# Patient Record
Sex: MALE | Race: WHITE | NOT HISPANIC OR LATINO | Employment: FULL TIME | URBAN - NONMETROPOLITAN AREA
[De-identification: names, ages, dates, MRNs, and addresses within clinical notes are randomized per-mention and may not be internally consistent; named-entity substitution may affect disease eponyms.]

---

## 2018-08-16 ENCOUNTER — OFFICE VISIT (OUTPATIENT)
Dept: ORTHOPEDIC SURGERY | Facility: CLINIC | Age: 33
End: 2018-08-16

## 2018-08-16 VITALS — HEIGHT: 69 IN | WEIGHT: 300 LBS | BODY MASS INDEX: 44.43 KG/M2

## 2018-08-16 DIAGNOSIS — M79.604 RIGHT LEG PAIN: Primary | ICD-10-CM

## 2018-08-16 PROCEDURE — 99213 OFFICE O/P EST LOW 20 MIN: CPT | Performed by: PHYSICIAN ASSISTANT

## 2018-08-16 RX ORDER — BUPROPION HYDROCHLORIDE 100 MG/1
150 TABLET ORAL 2 TIMES DAILY
COMMUNITY

## 2018-08-16 RX ORDER — CLONAZEPAM 0.5 MG/1
0.5 TABLET ORAL 2 TIMES DAILY PRN
COMMUNITY

## 2018-08-16 RX ORDER — CYCLOBENZAPRINE HCL 10 MG
10 TABLET ORAL 3 TIMES DAILY PRN
COMMUNITY

## 2018-08-16 NOTE — PROGRESS NOTES
"Brian Vanegas   :1985    Date of encounter:2018        HPI:  Brian Vanegas is a 33 y.o. male who presents here today with increased right leg pain.  We've previously seen him before for right leg pain with history of nonunion fibular shaft fracture.  He is also status post IM buster of the right tibia done in 2013.  He states that he is always had some pain that has been tolerable but in the last several weeks he's had an increase in pain that is not improving.  He states it significantly worse with weightbearing activity.  He states although he has recently started exercising he does not notice a difference on days that he exercises compared to days that he does not.  He describes it as a sharp stabbing pain along the anterior aspect of the tibia.  He denies paresthesias.    PMH:   Patient Active Problem List   Diagnosis   • Right leg pain   • Suture check       Exam:  General Appearance:    33 y.o. male  cooperative, in no acute distress.  Alert and oriented x 3,                   Vitals:    18 1013   Weight: 136 kg (300 lb)   Height: 175.3 cm (69\")          Body mass index is 44.3 kg/m².     examination of the right leg reveals a well-healed incision.  He has moderate tenderness along the anterior aspect of the tibia right at the incision.  There is a slight little palpable ridge jean well.  He also has tenderness along the distal screw.  His neurovascular status is intact.    Radiology:   AP and lateral views of the right tib-fib were reviewed revealing the nonunion of the right fibula shaft with IM buster in the tibia.  One of the distal screws is broken.  There is no significant interval change compared to x-rays taken 2 years ago.    Assessment    ICD-10-CM ICD-9-CM   1. Right leg pain M79.604 729.5       Plan:   a 33-year-old male with previous IM rodding of the right tibia and nonunion of the right fibula.  He has developed  Moderate stabbing pain in the last several weeks " with no new injury.  He is point tender anteriorly along the distal third of the tibia shaft.  He also has tenderness along the distal screw.  The hardware year may be the source of his pain.  I would like him to return back next week in follow-up with Dr. Torrezines this to discuss possible surgical interventions with possible removal of hardware.he was also provided today with an equalizer boot given the increased pain with weightbearing activities.  He states that the boot did seem to ease the pain with ambulation.    Ashwini Love PAC

## 2018-08-23 ENCOUNTER — OFFICE VISIT (OUTPATIENT)
Dept: ORTHOPEDIC SURGERY | Facility: CLINIC | Age: 33
End: 2018-08-23

## 2018-08-23 VITALS — BODY MASS INDEX: 44.43 KG/M2 | WEIGHT: 300 LBS | HEIGHT: 69 IN

## 2018-08-23 DIAGNOSIS — M79.604 RIGHT LEG PAIN: Primary | ICD-10-CM

## 2018-08-23 PROCEDURE — 20552 NJX 1/MLT TRIGGER POINT 1/2: CPT | Performed by: ORTHOPAEDIC SURGERY

## 2018-08-23 RX ADMIN — METHYLPREDNISOLONE ACETATE 80 MG: 40 INJECTION, SUSPENSION INTRA-ARTICULAR; INTRALESIONAL; INTRAMUSCULAR; SOFT TISSUE at 09:03

## 2018-08-23 RX ADMIN — BUPIVACAINE HYDROCHLORIDE 5 ML: 5 INJECTION, SOLUTION EPIDURAL; INTRACAUDAL at 09:03

## 2018-08-23 NOTE — PROGRESS NOTES
"Brian Vanegas   :1985    Date of encounter:2018    Chief Complaint   Patient presents with   • Right Lower Leg - Pain       HPI:  Brian Vanegas is a 33 y.o. male who returns here today for follow-up of right leg pain.  He's been wearing the equalizer boot for the last week and states it has helped decrease the pain with weightbearing activities.  He states if he does try to go without that the pain and swelling will return.  He's continuing to describe it as a sharp stabbing pain along the anterior aspect of the tibia.  He denies paresthesias.    PMH:   Patient Active Problem List   Diagnosis   • Right leg pain   • Suture check       Exam:  General Appearance:    33 y.o. male  cooperative, in no acute distress.  Alert and oriented x 3,                   Vitals:    18 0828   Weight: 136 kg (300 lb)   Height: 175.3 cm (69\")          Body mass index is 44.3 kg/m².     examination the right leg reveals a well-healed incision.  There is no significant swelling or ecchymosis .  He does have point tenderness anteriorly along the distal third of the tibial shaft just along where the callus formation is.  He has full range of motion at the knee and ankle and normal strength.  Neurovascular status is intact.    Radiology:   previous radiographs from an outside facility of the right tib-fib were reviewed revealing the nonunion of the fibular shaft with IM rodding the distal tibia with abundance of callus.  One of the distal most screws broken.  These x-rays were compared to some taken and 2016 without any significant changes.    Trigger point injection anterior tibia Right  Date/Time: 2018 9:03 AM  Performed by: NANCI RIOS  Authorized by: NANCI RIOS   Consent: Verbal consent obtained.  Risks and benefits: risks, benefits and alternatives were discussed  Consent given by: patient  Patient understanding: patient states understanding of the procedure being " performed  Preparation: Patient was prepped and draped in the usual sterile fashion.  Patient tolerance: Patient tolerated the procedure well with no immediate complications  Medications administered: 80 mg methylPREDNISolone acetate 40 MG/ML; 5 mL bupivacaine (PF) 0.5 %          Assessment    ICD-10-CM ICD-9-CM   1. Right leg pain M79.604 729.5       Plan:   33-year-old male with continued complaints of right leg pain with previous nonunion fibular shaft and IM rodding of the tibia done in 2013 at .  We again reviewed x-rays revealing no significant bony abnormalities.  He does have point tenderness along the distal tibial shaft just along where he has an abundance of callus.  He could have some irritation with tendinitis or bursitis almost area.  The diagnostic maneuver today we proceeded with a trigger point injection along the anterior tibia at the most tender point with 80 mg of Depo-Medrol and Marcaine block.  He tolerated the procedure well.  He can continue the boot as needed he will return back in 3-4 weeks for reevaluation.    Written by, Ashwini ABREU, acting as a scribe for Dr. Ward

## 2018-08-24 RX ORDER — METHYLPREDNISOLONE ACETATE 40 MG/ML
80 INJECTION, SUSPENSION INTRA-ARTICULAR; INTRALESIONAL; INTRAMUSCULAR; SOFT TISSUE
Status: COMPLETED | OUTPATIENT
Start: 2018-08-23 | End: 2018-08-23

## 2018-08-24 RX ORDER — BUPIVACAINE HYDROCHLORIDE 5 MG/ML
5 INJECTION, SOLUTION EPIDURAL; INTRACAUDAL
Status: COMPLETED | OUTPATIENT
Start: 2018-08-23 | End: 2018-08-23

## 2021-11-17 ENCOUNTER — TRANSCRIBE ORDERS (OUTPATIENT)
Dept: ADMINISTRATIVE | Facility: HOSPITAL | Age: 36
End: 2021-11-17

## 2021-11-17 DIAGNOSIS — R79.89 ELEVATED LFTS: Primary | ICD-10-CM

## 2021-11-18 ENCOUNTER — HOSPITAL ENCOUNTER (OUTPATIENT)
Dept: ULTRASOUND IMAGING | Facility: HOSPITAL | Age: 36
Discharge: HOME | End: 2021-11-18
Admitting: PHYSICIAN ASSISTANT

## 2021-11-18 DIAGNOSIS — R79.89 ELEVATED LFTS: ICD-10-CM

## 2021-11-18 PROCEDURE — 76700 US EXAM ABDOM COMPLETE: CPT | Performed by: RADIOLOGY

## 2021-11-18 PROCEDURE — 76700 US EXAM ABDOM COMPLETE: CPT

## 2024-07-29 ENCOUNTER — OFFICE VISIT (OUTPATIENT)
Dept: INTERNAL MEDICINE | Facility: CLINIC | Age: 39
End: 2024-07-29
Payer: COMMERCIAL

## 2024-07-29 VITALS
TEMPERATURE: 97.8 F | DIASTOLIC BLOOD PRESSURE: 82 MMHG | RESPIRATION RATE: 18 BRPM | OXYGEN SATURATION: 99 % | WEIGHT: 254 LBS | HEIGHT: 69 IN | BODY MASS INDEX: 37.62 KG/M2 | SYSTOLIC BLOOD PRESSURE: 140 MMHG | HEART RATE: 77 BPM

## 2024-07-29 DIAGNOSIS — F41.9 ANXIETY: ICD-10-CM

## 2024-07-29 DIAGNOSIS — F33.1 MODERATE EPISODE OF RECURRENT MAJOR DEPRESSIVE DISORDER: ICD-10-CM

## 2024-07-29 DIAGNOSIS — Z13.0 SCREENING FOR DISORDER OF BLOOD AND BLOOD-FORMING ORGANS: ICD-10-CM

## 2024-07-29 DIAGNOSIS — E55.9 VITAMIN D DEFICIENCY: ICD-10-CM

## 2024-07-29 DIAGNOSIS — R79.89 LOW TESTOSTERONE: ICD-10-CM

## 2024-07-29 DIAGNOSIS — Z76.89 ENCOUNTER TO ESTABLISH CARE: Primary | ICD-10-CM

## 2024-07-29 DIAGNOSIS — S10.12XA BLISTER OF THROAT: ICD-10-CM

## 2024-07-29 DIAGNOSIS — F43.10 PTSD (POST-TRAUMATIC STRESS DISORDER): ICD-10-CM

## 2024-07-29 DIAGNOSIS — G47.33 OSA ON CPAP: ICD-10-CM

## 2024-07-29 DIAGNOSIS — Z13.220 SCREENING FOR LIPID DISORDERS: ICD-10-CM

## 2024-07-29 DIAGNOSIS — Z12.5 SCREENING FOR PROSTATE CANCER: ICD-10-CM

## 2024-07-29 RX ORDER — CLONAZEPAM 0.5 MG/1
0.5 TABLET ORAL 2 TIMES DAILY PRN
Qty: 60 TABLET | Refills: 0 | Status: SHIPPED | OUTPATIENT
Start: 2024-07-29

## 2024-07-29 RX ORDER — CLINDAMYCIN HYDROCHLORIDE 150 MG/1
300 CAPSULE ORAL 4 TIMES DAILY
COMMUNITY
Start: 2024-07-27 | End: 2024-08-03

## 2024-07-29 RX ORDER — ASPIRIN 81 MG/1
TABLET, CHEWABLE ORAL
COMMUNITY

## 2024-07-29 RX ORDER — TADALAFIL 5 MG/1
5 TABLET ORAL DAILY
COMMUNITY

## 2024-07-29 RX ORDER — FLUCONAZOLE 200 MG/1
200 TABLET ORAL DAILY
COMMUNITY
Start: 2024-07-27 | End: 2024-08-01

## 2024-07-29 RX ORDER — VITAMIN B COMPLEX
CAPSULE ORAL DAILY
COMMUNITY
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
## 2024-08-01 LAB
BACTERIA SPEC RESP CULT: NORMAL
BACTERIA SPEC RESP CULT: NORMAL

## 2024-08-18 PROBLEM — G47.33 OSA ON CPAP: Status: ACTIVE | Noted: 2024-08-18

## 2024-08-18 PROBLEM — F43.10 PTSD (POST-TRAUMATIC STRESS DISORDER): Status: ACTIVE | Noted: 2024-08-18

## 2024-08-18 PROBLEM — F33.1 MODERATE EPISODE OF RECURRENT MAJOR DEPRESSIVE DISORDER: Status: ACTIVE | Noted: 2024-08-18

## 2024-08-18 PROBLEM — F41.9 ANXIETY: Status: ACTIVE | Noted: 2024-08-18

## 2024-10-04 LAB
25(OH)D3+25(OH)D2 SERPL-MCNC: 52 NG/ML (ref 30–100)
CHOLEST SERPL-MCNC: 180 MG/DL (ref 0–200)
FOLATE SERPL-MCNC: 13.5 NG/ML (ref 4.78–24.2)
HDLC SERPL-MCNC: 52 MG/DL (ref 40–60)
LDLC SERPL CALC-MCNC: 114 MG/DL (ref 0–100)
PSA SERPL-MCNC: 1.9 NG/ML (ref 0–4)
TESTOST FREE SERPL-MCNC: 9.9 PG/ML (ref 6.8–21.5)
TESTOST SERPL-MCNC: NORMAL NG/DL
TRIGL SERPL-MCNC: 77 MG/DL (ref 0–150)
VIT B12 SERPL-MCNC: 445 PG/ML (ref 211–946)
VLDLC SERPL CALC-MCNC: 14 MG/DL (ref 5–40)

## 2024-10-09 LAB
25(OH)D3+25(OH)D2 SERPL-MCNC: 52 NG/ML (ref 30–100)
CHOLEST SERPL-MCNC: 180 MG/DL (ref 0–200)
FOLATE SERPL-MCNC: 13.5 NG/ML (ref 4.78–24.2)
HDLC SERPL-MCNC: 52 MG/DL (ref 40–60)
LDLC SERPL CALC-MCNC: 114 MG/DL (ref 0–100)
PSA SERPL-MCNC: 1.9 NG/ML (ref 0–4)
TESTOST FREE SERPL-MCNC: 9.9 PG/ML (ref 6.8–21.5)
TESTOST SERPL-MCNC: 281.1 NG/DL (ref 264–916)
TRIGL SERPL-MCNC: 77 MG/DL (ref 0–150)
VIT B12 SERPL-MCNC: 445 PG/ML (ref 211–946)
VLDLC SERPL CALC-MCNC: 14 MG/DL (ref 5–40)

## 2024-10-16 ENCOUNTER — OFFICE VISIT (OUTPATIENT)
Dept: INTERNAL MEDICINE | Facility: CLINIC | Age: 39
End: 2024-10-16
Payer: COMMERCIAL

## 2024-10-16 VITALS
BODY MASS INDEX: 40.4 KG/M2 | HEART RATE: 84 BPM | TEMPERATURE: 98.2 F | SYSTOLIC BLOOD PRESSURE: 152 MMHG | DIASTOLIC BLOOD PRESSURE: 98 MMHG | OXYGEN SATURATION: 96 % | WEIGHT: 272.8 LBS | HEIGHT: 69 IN

## 2024-10-16 DIAGNOSIS — N52.9 ERECTILE DYSFUNCTION, UNSPECIFIED ERECTILE DYSFUNCTION TYPE: ICD-10-CM

## 2024-10-16 DIAGNOSIS — E66.01 CLASS 3 SEVERE OBESITY DUE TO EXCESS CALORIES WITHOUT SERIOUS COMORBIDITY WITH BODY MASS INDEX (BMI) OF 40.0 TO 44.9 IN ADULT: ICD-10-CM

## 2024-10-16 DIAGNOSIS — R03.0 ELEVATED BLOOD PRESSURE READING: ICD-10-CM

## 2024-10-16 DIAGNOSIS — G47.33 OSA ON CPAP: ICD-10-CM

## 2024-10-16 DIAGNOSIS — M62.838 MUSCLE SPASM: ICD-10-CM

## 2024-10-16 DIAGNOSIS — L81.9 PIGMENTED SKIN LESION: ICD-10-CM

## 2024-10-16 DIAGNOSIS — E66.813 CLASS 3 SEVERE OBESITY DUE TO EXCESS CALORIES WITHOUT SERIOUS COMORBIDITY WITH BODY MASS INDEX (BMI) OF 40.0 TO 44.9 IN ADULT: ICD-10-CM

## 2024-10-16 DIAGNOSIS — M77.8 TENDINITIS OF LEFT ELBOW: Primary | ICD-10-CM

## 2024-10-16 PROCEDURE — 99214 OFFICE O/P EST MOD 30 MIN: CPT | Performed by: NURSE PRACTITIONER

## 2024-10-16 RX ORDER — MELOXICAM 7.5 MG/1
7.5 TABLET ORAL DAILY
Qty: 90 TABLET | Refills: 0 | Status: SHIPPED | OUTPATIENT
Start: 2024-10-16

## 2024-10-16 RX ORDER — METHOCARBAMOL 500 MG/1
500 TABLET, FILM COATED ORAL 3 TIMES DAILY PRN
Qty: 90 TABLET | Refills: 1 | Status: SHIPPED | OUTPATIENT
Start: 2024-10-16

## 2024-10-16 RX ORDER — TADALAFIL 5 MG/1
5 TABLET ORAL DAILY
Qty: 30 TABLET | Refills: 2 | Status: SHIPPED | OUTPATIENT
Start: 2024-10-16 | End: 2024-10-21 | Stop reason: SDUPTHER
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
## 2024-10-21 DIAGNOSIS — N52.9 ERECTILE DYSFUNCTION, UNSPECIFIED ERECTILE DYSFUNCTION TYPE: ICD-10-CM

## 2024-10-21 RX ORDER — TADALAFIL 5 MG/1
5 TABLET ORAL DAILY
Qty: 90 TABLET | Refills: 1 | Status: SHIPPED | OUTPATIENT
Start: 2024-10-21

## 2024-10-22 ENCOUNTER — PRIOR AUTHORIZATION (OUTPATIENT)
Dept: INTERNAL MEDICINE | Facility: CLINIC | Age: 39
End: 2024-10-22
Payer: COMMERCIAL

## 2024-10-25 NOTE — TELEPHONE ENCOUNTER
Checked on status via CoverMyMeds, no update.       Based on some of the questions on the PA, I don't think they will cover Zepbound without the Pt trying Saxenda and Wegovy.

## 2024-10-28 NOTE — TELEPHONE ENCOUNTER
Left VM asking that patient call back to let us know if he is agreeable to trying Wegovy instead of Zepbound.

## 2024-10-29 ENCOUNTER — PATIENT MESSAGE (OUTPATIENT)
Dept: INTERNAL MEDICINE | Facility: CLINIC | Age: 39
End: 2024-10-29
Payer: COMMERCIAL

## 2024-10-29 DIAGNOSIS — E66.01 CLASS 3 SEVERE OBESITY DUE TO EXCESS CALORIES WITHOUT SERIOUS COMORBIDITY WITH BODY MASS INDEX (BMI) OF 40.0 TO 44.9 IN ADULT: Primary | ICD-10-CM

## 2024-10-29 DIAGNOSIS — E66.813 CLASS 3 SEVERE OBESITY DUE TO EXCESS CALORIES WITHOUT SERIOUS COMORBIDITY WITH BODY MASS INDEX (BMI) OF 40.0 TO 44.9 IN ADULT: Primary | ICD-10-CM

## 2024-10-29 RX ORDER — SEMAGLUTIDE 0.25 MG/.5ML
0.25 INJECTION, SOLUTION SUBCUTANEOUS WEEKLY
Qty: 2 ML | Refills: 0 | Status: SHIPPED | OUTPATIENT
Start: 2024-10-29

## 2024-11-19 ENCOUNTER — TELEPHONE (OUTPATIENT)
Dept: INTERNAL MEDICINE | Facility: CLINIC | Age: 39
End: 2024-11-19
Payer: COMMERCIAL

## 2024-11-19 DIAGNOSIS — E66.01 CLASS 3 SEVERE OBESITY DUE TO EXCESS CALORIES WITHOUT SERIOUS COMORBIDITY WITH BODY MASS INDEX (BMI) OF 40.0 TO 44.9 IN ADULT: ICD-10-CM

## 2024-11-19 DIAGNOSIS — E66.813 CLASS 3 SEVERE OBESITY DUE TO EXCESS CALORIES WITHOUT SERIOUS COMORBIDITY WITH BODY MASS INDEX (BMI) OF 40.0 TO 44.9 IN ADULT: ICD-10-CM

## 2024-11-20 RX ORDER — SEMAGLUTIDE 1.7 MG/.75ML
1.7 INJECTION, SOLUTION SUBCUTANEOUS WEEKLY
Qty: 2 ML | Refills: 0 | Status: SHIPPED | OUTPATIENT
Start: 2025-01-15

## 2024-11-20 RX ORDER — SEMAGLUTIDE 0.5 MG/.5ML
0.5 INJECTION, SOLUTION SUBCUTANEOUS WEEKLY
Qty: 2 ML | Refills: 0 | Status: SHIPPED | OUTPATIENT
Start: 2024-11-20

## 2024-11-20 RX ORDER — SEMAGLUTIDE 2.4 MG/.75ML
2.4 INJECTION, SOLUTION SUBCUTANEOUS WEEKLY
Qty: 6 ML | Refills: 2 | Status: SHIPPED | OUTPATIENT
Start: 2025-02-12

## 2024-11-20 RX ORDER — SEMAGLUTIDE 1 MG/.5ML
1 INJECTION, SOLUTION SUBCUTANEOUS WEEKLY
Qty: 2 ML | Refills: 0 | Status: SHIPPED | OUTPATIENT
Start: 2024-12-18

## 2025-02-21 ENCOUNTER — OFFICE VISIT (OUTPATIENT)
Dept: INTERNAL MEDICINE | Facility: CLINIC | Age: 40
End: 2025-02-21
Payer: COMMERCIAL

## 2025-02-21 VITALS
DIASTOLIC BLOOD PRESSURE: 98 MMHG | OXYGEN SATURATION: 98 % | BODY MASS INDEX: 42.65 KG/M2 | WEIGHT: 288 LBS | HEART RATE: 95 BPM | TEMPERATURE: 98.7 F | HEIGHT: 69 IN | SYSTOLIC BLOOD PRESSURE: 160 MMHG

## 2025-02-21 DIAGNOSIS — I10 PRIMARY HYPERTENSION: ICD-10-CM

## 2025-02-21 DIAGNOSIS — Z00.00 ANNUAL PHYSICAL EXAM: Primary | ICD-10-CM

## 2025-02-21 DIAGNOSIS — I89.0 LYMPHEDEMA OF RIGHT LOWER EXTREMITY: ICD-10-CM

## 2025-02-21 DIAGNOSIS — F33.1 MODERATE EPISODE OF RECURRENT MAJOR DEPRESSIVE DISORDER: ICD-10-CM

## 2025-02-21 DIAGNOSIS — E66.01 CLASS 3 SEVERE OBESITY DUE TO EXCESS CALORIES WITH SERIOUS COMORBIDITY AND BODY MASS INDEX (BMI) OF 40.0 TO 44.9 IN ADULT: ICD-10-CM

## 2025-02-21 DIAGNOSIS — F41.9 ANXIETY: ICD-10-CM

## 2025-02-21 DIAGNOSIS — Z87.81: ICD-10-CM

## 2025-02-21 DIAGNOSIS — G47.33 OSA ON CPAP: ICD-10-CM

## 2025-02-21 DIAGNOSIS — F43.10 PTSD (POST-TRAUMATIC STRESS DISORDER): ICD-10-CM

## 2025-02-21 DIAGNOSIS — N52.9 ERECTILE DYSFUNCTION, UNSPECIFIED ERECTILE DYSFUNCTION TYPE: ICD-10-CM

## 2025-02-21 DIAGNOSIS — M79.604 RIGHT LEG PAIN: ICD-10-CM

## 2025-02-21 DIAGNOSIS — M21.70 LEG LENGTH DISCREPANCY: ICD-10-CM

## 2025-02-21 DIAGNOSIS — E66.813 CLASS 3 SEVERE OBESITY DUE TO EXCESS CALORIES WITH SERIOUS COMORBIDITY AND BODY MASS INDEX (BMI) OF 40.0 TO 44.9 IN ADULT: ICD-10-CM

## 2025-02-21 PROCEDURE — 99396 PREV VISIT EST AGE 40-64: CPT | Performed by: NURSE PRACTITIONER

## 2025-02-21 RX ORDER — BUSPIRONE HYDROCHLORIDE 5 MG/1
5 TABLET ORAL 3 TIMES DAILY PRN
Qty: 90 TABLET | Refills: 1 | Status: SHIPPED | OUTPATIENT
Start: 2025-02-21

## 2025-02-21 RX ORDER — TADALAFIL 5 MG/1
5 TABLET ORAL DAILY
Qty: 90 TABLET | Refills: 1 | Status: SHIPPED | OUTPATIENT
Start: 2025-02-21

## 2025-02-21 RX ORDER — LISINOPRIL 10 MG/1
10 TABLET ORAL DAILY
Qty: 30 TABLET | Refills: 1 | Status: SHIPPED | OUTPATIENT
Start: 2025-02-21

## 2025-02-21 NOTE — PROGRESS NOTES
"Chief Complaint   Patient presents with    Annual Exam       Brian Vanegas is a 39 y.o. male and is here for a comprehensive physical exam.     HTN.  Has taken lisinopril in the past, does not recall cough or other specific side effects.  Blood pressure has been elevated in the past 6 months.  He has significant stress at work right now. Does not monitor BP at home right now. Denies chest pain, dyspnea, orthopnea, palpitations, lower extremity edema, confusion, headaches, weakness, visual disturbances.    No longer taking wegovy, insurance coverage changed.      Anxiety continues.  Has worsened.  Drinking a pot of coffee day, which is more than normal for him. Has taken buspar and clonazepam in the past, worked well.      Leg length discrepancy after nonunion of fracture in right leg, has lymphedema. Right leg longer, was wearing a 1/4\" spacer in left shoe normally.  It has been damaged, has noticed an increase in leg pain since he has not been wearing spacer.       History:  Sexually active with spouse, monogamous.  STI testing in the past  Erectile dysfunction: yes  Nocturia: no    Health Habits:  Dental Exam. not up to date - brushes teeth 1-2 x a day  Eye Exam. not up to date - wears glasses for computer work  Exercise: 0 times/week.  Current exercise activities include: none  Typical American diet  Do you take any herbs or supplements that were not prescribed by a doctor? Yes, zinc, D3, magnesium threonate, krill oil, CoQ10, P5P    Health Maintenance   Topic Date Due    BMI FOLLOWUP  Never done    HEPATITIS C SCREENING  Never done    INFLUENZA VACCINE  03/31/2025 (Originally 7/1/2024)    COVID-19 Vaccine (1 - 2024-25 season) 02/21/2026 (Originally 9/1/2024)    LIPID PANEL  10/03/2025    ANNUAL PHYSICAL  02/21/2026    TDAP/TD VACCINES (3 - Td or Tdap) 07/01/2026    Pneumococcal Vaccine 0-49  Aged Out       PMH, PSH, SocHx, FamHx, Allergies, and Medications: Reviewed and updated in the Visit Navigator. " "    No Known Allergies  Past Medical History:   Diagnosis Date    Blood clot in vein     x 2    GERD (gastroesophageal reflux disease)     High cholesterol     Hypertension     Leg pain, right     Osteoarthritis     PTSD (post-traumatic stress disorder)     Sleep apnea     Sleep apnea      Past Surgical History:   Procedure Laterality Date    CLOSED REDUCTION HAND FRACTURE      ORIF TIBIA/FIBULA FRACTURES Right 2013     Social History     Socioeconomic History    Marital status:    Tobacco Use    Smoking status: Never     Passive exposure: Past    Smokeless tobacco: Never   Vaping Use    Vaping status: Never Used   Substance and Sexual Activity    Alcohol use: No    Drug use: No     Family History   Problem Relation Age of Onset    Heart disease Mother     Hypertension Mother     Cancer Father         lung    Heart disease Father     Hypertension Father     Hypertension Sister     Heart disease Sister        Review of Systems  Review of Systems   All other systems reviewed and are negative.      Objective   /98   Pulse 95   Temp 98.7 °F (37.1 °C)   Ht 175.3 cm (69.02\")   Wt 131 kg (288 lb)   SpO2 98%   BMI 42.51 kg/m²   Body mass index is 42.51 kg/m².         Physical Exam  Constitutional:       Appearance: He is well-developed. He is morbidly obese. He is not ill-appearing.   HENT:      Head: Normocephalic.      Right Ear: Tympanic membrane, ear canal and external ear normal.      Left Ear: Tympanic membrane, ear canal and external ear normal.      Nose: Nose normal.      Mouth/Throat:      Mouth: Mucous membranes are moist.      Pharynx: Oropharynx is clear. Uvula midline.   Eyes:      Extraocular Movements: Extraocular movements intact.      Conjunctiva/sclera: Conjunctivae normal.      Pupils: Pupils are equal, round, and reactive to light.   Neck:      Thyroid: No thyromegaly.      Vascular: No carotid bruit.   Cardiovascular:      Rate and Rhythm: Normal rate and regular rhythm.      Heart " sounds: Normal heart sounds.   Pulmonary:      Effort: Pulmonary effort is normal.      Breath sounds: Normal breath sounds.   Abdominal:      General: Bowel sounds are normal.      Palpations: Abdomen is soft.      Tenderness: There is no abdominal tenderness.   Musculoskeletal:         General: No tenderness or deformity. Normal range of motion.      Cervical back: Full passive range of motion without pain, normal range of motion and neck supple.   Lymphadenopathy:      Cervical: No cervical adenopathy.   Skin:     General: Skin is warm.      Capillary Refill: Capillary refill takes less than 2 seconds.   Neurological:      Mental Status: He is alert and oriented to person, place, and time.      Sensory: No sensory deficit.      Coordination: Coordination normal.      Gait: Gait normal.      Comments: CN II-XII normal   Psychiatric:         Attention and Perception: Attention normal.         Mood and Affect: Mood and affect normal.         Speech: Speech normal.         Behavior: Behavior normal.         Thought Content: Thought content normal.         The 10-year ASCVD risk score (Jozef RUIZ, et al., 2019) is: 1.7%    Values used to calculate the score:      Age: 39 years      Sex: Male      Is Non- : No      Diabetic: No      Tobacco smoker: No      Systolic Blood Pressure: 160 mmHg      Is BP treated: Yes      HDL Cholesterol: 52 mg/dL      Total Cholesterol: 180 mg/dL    Assessment & Plan   1. Healthy male exam.    2. Patient Counseling: Including but not Limited to the following, when appropriate:  --Nutrition: Stressed importance of moderation in sodium/caffeine intake, saturated fat and cholesterol, caloric balance, sufficient intake of fresh fruits, vegetables, fiber  .--Discussed the issue of daily use of baby aspirin.  --Exercise: Stressed the importance of regular exercise.   --Substance Abuse: Discussed cessation/primary prevention of tobacco, alcohol, or other drug use; driving  or other dangerous activities under the influence; availability of treatment for abuse, as indicated based on social history.    --Sexuality: Discussed sexually transmitted diseases, partner selection, use of condoms and contraceptive alternatives.   --Injury prevention: Discussed safety belts, safety helmets, smoke detector, smoking near bedding or upholstery.   --Dental health: Discussed importance of regular tooth brushing, flossing, and dental visits.  --Immunizations reviewed.  --Discussed benefits of colon cancer screening.  3. Discussed the patient's BMI with him.  The BMI is above average; BMI management plan is completed  4. Return in about 4 weeks (around 3/21/2025) for Next scheduled follow up.  5. Age-appropriate Screening Scheduled    Assessment & Plan     Diagnoses and all orders for this visit:    1. Annual physical exam (Primary)    2. Primary hypertension  -     lisinopril (PRINIVIL,ZESTRIL) 10 MG tablet; Take 1 tablet by mouth Daily.  Dispense: 30 tablet; Refill: 1        - Follow heart healthy diet.  Keep sodium intake < 1500 mg per day.  Avoid processed & fast foods.          - Exercise as tolerated, with a goal of 30 minutes of moderate exercise most days.         - Take medications as prescribed.    3. Anxiety  -     busPIRone (BUSPAR) 5 MG tablet; Take 1 tablet by mouth 3 (Three) Times a Day As Needed (anxiety).  Dispense: 90 tablet; Refill: 1        - Encouraged to take part in daily physical exercise.          - Eat healthy, well balanced diet; avoid sugary foods or beverages        - Continue to abstain from alcohol and drugs         - Ensure good night's sleep by creating calm space in bedroom, avoiding screen time 1-2 hours before bed, no caffeine after 5 pm        - Talk to supportive family and friends, as needed        - Consider journaling, other creative way to express feelings, if needed  4. Moderate episode of recurrent major depressive disorder  5. PTSD (post-traumatic stress  disorder)    6. TRIXIE on CPAP        - Continue cpap     7. Right leg pain  -     Ambulatory Referral to Orthopedic Surgery    8. Leg length discrepancy  -     Ambulatory Referral to Orthopedic Surgery    9. History of fracture with nonunion  -     Ambulatory Referral to Orthopedic Surgery    10. Lymphedema of right lower extremity    11. Erectile dysfunction, unspecified erectile dysfunction type  -     tadalafil (CIALIS) 5 MG tablet; Take 1 tablet by mouth Daily.  Dispense: 90 tablet; Refill: 1    12. Class 3 severe obesity due to excess calories with serious comorbidity and body mass index (BMI) of 40.0 to 44.9 in adult        - Class 3 Severe Obesity (BMI >=40). Obesity-related health conditions include the following: obstructive sleep apnea, hypertension, dyslipidemias, and osteoarthritis. Obesity is worsening. BMI is is above average; BMI management plan is completed. We discussed low calorie, low carb based diet program, portion control, increasing exercise, consulting a Bariatric surgeon, and pharmacologic options including phentermine (not an option with HTN), GLP-1 injection (not covered by insurance), contrave/wellbutrin (could increase anxiety) .

## 2025-03-21 ENCOUNTER — OFFICE VISIT (OUTPATIENT)
Dept: INTERNAL MEDICINE | Facility: CLINIC | Age: 40
End: 2025-03-21
Payer: COMMERCIAL

## 2025-03-21 VITALS
DIASTOLIC BLOOD PRESSURE: 84 MMHG | OXYGEN SATURATION: 97 % | SYSTOLIC BLOOD PRESSURE: 128 MMHG | BODY MASS INDEX: 42.65 KG/M2 | HEIGHT: 69 IN | HEART RATE: 86 BPM | WEIGHT: 288 LBS | TEMPERATURE: 98.1 F

## 2025-03-21 DIAGNOSIS — I10 PRIMARY HYPERTENSION: ICD-10-CM

## 2025-03-21 DIAGNOSIS — F33.1 MODERATE EPISODE OF RECURRENT MAJOR DEPRESSIVE DISORDER: Primary | ICD-10-CM

## 2025-03-21 PROCEDURE — 99214 OFFICE O/P EST MOD 30 MIN: CPT | Performed by: NURSE PRACTITIONER

## 2025-03-21 RX ORDER — LISINOPRIL 10 MG/1
10 TABLET ORAL DAILY
Qty: 90 TABLET | Refills: 1 | Status: SHIPPED | OUTPATIENT
Start: 2025-03-21

## 2025-03-21 RX ORDER — CEPHALEXIN 500 MG/1
500 CAPSULE ORAL 4 TIMES DAILY
COMMUNITY
Start: 2025-03-19 | End: 2025-03-26

## 2025-03-21 RX ORDER — LISINOPRIL 10 MG/1
10 TABLET ORAL DAILY
Qty: 90 TABLET | OUTPATIENT
Start: 2025-03-21

## 2025-03-21 RX ORDER — BUPROPION HYDROCHLORIDE 150 MG/1
150 TABLET ORAL DAILY
Qty: 90 TABLET | Refills: 0 | Status: SHIPPED | OUTPATIENT
Start: 2025-03-21

## 2025-03-21 NOTE — PROGRESS NOTES
Office Visit      Patient Name: Brian Vanegas  : 1985   MRN: [id number]     Chief Complaint:    Chief Complaint   Patient presents with    Anxiety    Hypertension    Cellulitis     ED FOLLOW UP       History of Present Illness:   History of Present Illness  Brian Vanegas is a 39 y.o. male who is here today for evaluation of anxiety, hypertension, and cellulitis.    He was evaluated in the emergency room 3 days ago due to concerns about a potential deep vein thrombosis (DVT), given his medical history. However, the diagnosis was cellulitis. He has been on Keflex since Wednesday, which has resulted in some improvement. The pain has lessened, and the redness and hardness have become more diffuse. He reports that the condition has not worsened since starting the medication. He is not experiencing any fevers or chills. He also notes a decrease in leg swelling compared to his usual state, and his mobility has improved with less limping. He typically uses compression socks to manage his leg swelling.    He is currently on lisinopril for hypertension, which he tolerates well, although he occasionally experiences a cough. He believes this cough may be related to his stomach issues. He also reports erectile dysfunction, which he attributes to his medication, but notes that Cialis has been beneficial. He is considering lifestyle modifications, including weight loss and increased physical activity, to manage his health. Denies chest pain, dyspnea, orthopnea, palpitations, lower extremity edema, confusion, headaches, weakness, visual disturbances.    He has previously been on medication for ADHD and has noticed a difference in his work performance since discontinuing it. He is reluctant to take multiple medications daily. He has tried Wellbutrin and Strattera in the past, which were beneficial, and is open to trying them again. Denies depressed mood, anhedonia, insomnia, hypersomnia, fatigue, feelings of  "worthlessness, difficulty concentrating, impaired memory, SI, HI, panic attacks, weight change.      Subjective      I have reviewed and the following portions of the patient's history were updated as appropriate: past family history, past medical history, past social history, past surgical history and problem list.      Current Outpatient Medications:     aspirin 81 MG chewable tablet, , Disp: , Rfl:     busPIRone (BUSPAR) 5 MG tablet, Take 1 tablet by mouth 3 (Three) Times a Day As Needed (anxiety)., Disp: 90 tablet, Rfl: 1    clonazePAM (KlonoPIN) 0.5 MG tablet, Take 1 tablet by mouth 2 (Two) Times a Day As Needed for Anxiety., Disp: 60 tablet, Rfl: 0    lisinopril (PRINIVIL,ZESTRIL) 10 MG tablet, Take 1 tablet by mouth Daily., Disp: 90 tablet, Rfl: 1    meloxicam (Mobic) 7.5 MG tablet, Take 1 tablet by mouth Daily., Disp: 90 tablet, Rfl: 0    methocarbamol (ROBAXIN) 500 MG tablet, Take 1 tablet by mouth 3 (Three) Times a Day As Needed for Muscle Spasms., Disp: 90 tablet, Rfl: 1    tadalafil (CIALIS) 5 MG tablet, Take 1 tablet by mouth Daily., Disp: 90 tablet, Rfl: 1    VITAMIN D PO, Take  by mouth. With vit K, Disp: , Rfl:     buPROPion XL (Wellbutrin XL) 150 MG 24 hr tablet, Take 1 tablet by mouth Daily., Disp: 90 tablet, Rfl: 0    No Known Allergies    Objective     Physical Exam:  Vital Signs:   Vitals:    03/21/25 1515   BP: 128/84   Pulse: 86   Temp: 98.1 °F (36.7 °C)   SpO2: 97%   Weight: 131 kg (288 lb)   Height: 175.3 cm (69.02\")     Body mass index is 42.51 kg/m².         Physical Exam  Constitutional:       Appearance: He is not ill-appearing.   HENT:      Head: Normocephalic.      Right Ear: External ear normal.      Left Ear: External ear normal.   Eyes:      Conjunctiva/sclera: Conjunctivae normal.      Pupils: Pupils are equal, round, and reactive to light.   Cardiovascular:      Rate and Rhythm: Normal rate and regular rhythm.      Pulses:           Radial pulses are 2+ on the right side and 2+ on " the left side.        Dorsalis pedis pulses are 2+ on the right side and 2+ on the left side.      Heart sounds: Normal heart sounds.   Pulmonary:      Effort: Pulmonary effort is normal.      Breath sounds: Normal breath sounds.   Musculoskeletal:      Cervical back: Normal range of motion and neck supple.   Skin:     General: Skin is warm.      Capillary Refill: Capillary refill takes less than 2 seconds.   Neurological:      Mental Status: He is alert and oriented to person, place, and time.      Coordination: Coordination normal.      Gait: Gait normal.   Psychiatric:         Attention and Perception: Attention normal.         Mood and Affect: Mood and affect normal.         Speech: Speech normal.         Behavior: Behavior normal.             Assessment / Plan      Assessment/Plan:   Diagnoses and all orders for this visit:    1. Moderate episode of recurrent major depressive disorder (Primary)  -     buPROPion XL (Wellbutrin XL) 150 MG 24 hr tablet; Take 1 tablet by mouth Daily.  Dispense: 90 tablet; Refill: 0        - Encouraged to take part in daily physical exercise.          - Eat healthy, well balanced diet; avoid sugary foods or beverages        - Abstain from alcohol         - Ensure good night's sleep by creating calm space in bedroom, avoiding screen time 1-2 hours before bed, no caffeine after 5 pm        - Talk to supportive family and friends, as needed    2. Primary hypertension  -     lisinopril (PRINIVIL,ZESTRIL) 10 MG tablet; Take 1 tablet by mouth Daily.  Dispense: 90 tablet; Refill: 1        - Follow heart healthy diet.  Keep sodium intake < 1500 mg per day.  Avoid processed & fast foods.          - Exercise as tolerated, with a goal of 30 minutes of moderate exercise most days.         - Take medications as prescribed.             Follow Up:   Return in about 4 weeks (around 4/18/2025) for Next scheduled follow up.    Patient was given instructions and counseling regarding his condition or  for health maintenance advice. Please see specific information pulled into the AVS if appropriate.       [facility]     Please note that portions of this note may have been completed with a voice recognition program. Efforts were made to edit dictation, but occasionally words are mistranscribed.

## 2025-04-15 ENCOUNTER — TELEMEDICINE (OUTPATIENT)
Dept: INTERNAL MEDICINE | Facility: CLINIC | Age: 40
End: 2025-04-15
Payer: COMMERCIAL

## 2025-04-15 VITALS — HEIGHT: 69 IN | BODY MASS INDEX: 42.51 KG/M2

## 2025-04-15 DIAGNOSIS — F33.1 MODERATE EPISODE OF RECURRENT MAJOR DEPRESSIVE DISORDER: Primary | ICD-10-CM

## 2025-04-15 DIAGNOSIS — I10 PRIMARY HYPERTENSION: ICD-10-CM

## 2025-04-15 PROCEDURE — 99214 OFFICE O/P EST MOD 30 MIN: CPT | Performed by: NURSE PRACTITIONER

## 2025-04-15 RX ORDER — BUPROPION HYDROCHLORIDE 300 MG/1
300 TABLET ORAL DAILY
Qty: 90 TABLET | Refills: 3 | Status: SHIPPED | OUTPATIENT
Start: 2025-04-15

## 2025-04-15 NOTE — PROGRESS NOTES
Mode of Visit: Video  Location of patient: -OTHER-: parking lot in [place]  Location of provider: +[facility]+  You have chosen to receive care through a telehealth visit.  The patient has signed the video visit consent form.  The visit included audio and video interaction. No technical issues occurred during this visit.    Date: 04/15/2025  Name: Brian Vanegas  : 1985         Chief Complaint:   Chief Complaint   Patient presents with    Depression         HPI:   History of Present Illness   Brian Vanegas is a 40 y.o. male presents for video visit in regard to depression.      He reports a positive response to his current medication regimen, with no discernible side effects. An increase in dosage is requested, as the current dose has slightly suppressed his appetite but he believes a higher dose would be more therapeutic. Blood pressure is monitored weekly, with readings typically within normal limits, ranging from 120 to 130 systolic. Occasional elevations to approximately 140/90 are noted, attributed to situational factors. A new job will commence next week, with a transition to new insurance in 6 months. Video visits are requested for future appointments due to potential changes in leave availability.    HTN.  Taking lisinopril as prescribed.  Does not monitor BP at home, typically WNL.  Denies chest pain, dyspnea, orthopnea, palpitations, lower extremity edema, confusion, headaches, weakness, visual disturbances.        History: The following portions of the patient's history were reviewed and updated as appropriate: allergies, current medications, past medical history, family history, surgical history, social history and problem list.      ROS:  Review of Systems      PE:  Physical Exam   Constitutional: He appears well-developed and well-nourished. No distress.   HENT:   Head: Normocephalic.   Right Ear: External ear normal.   Left Ear: External ear normal.   Eyes: Pupils are equal, round,  and reactive to light. Conjunctivae are normal.   Neck: Neck normal appearance.  Pulmonary/Chest: Effort normal.   Neurological: He is alert.   Oriented x 3   Skin: No pallor.   Psychiatric: He has a normal mood and affect. His speech is normal and behavior is normal. Thought content normal. His affect is normal.          Assessment/Plan:  Diagnoses and all orders for this visit:    1. Moderate episode of recurrent major depressive disorder (Primary)  -     buPROPion XL (Wellbutrin XL) 300 MG 24 hr tablet; Take 1 tablet by mouth Daily.  Dispense: 90 tablet; Refill: 3.  Dose increased as requested. Aware that side effects/risk of side effects increases with higher dose of medication.         - Encouraged to take part in daily physical exercise.          - Eat healthy, well balanced diet; avoid sugary foods or beverages        - Continue to abstain from alcohol and drugs         - Ensure good night's sleep by creating calm space in bedroom, avoiding screen time 1-2 hours before bed, no caffeine after 5 pm        - Talk to supportive family and friends, as needed    2. Primary hypertension        - Follow heart healthy diet.  Keep sodium intake < 1500 mg per day.  Avoid processed & fast foods.          - Exercise as tolerated, with a goal of 30 minutes of moderate exercise most days.         - Take medications as prescribed.        Return for Annual.  Patient was given instructions and counseling regarding her condition or for health maintenance advice. Please see specific information pulled into the AVS if appropriate.

## 2025-07-31 DIAGNOSIS — N52.9 ERECTILE DYSFUNCTION, UNSPECIFIED ERECTILE DYSFUNCTION TYPE: ICD-10-CM

## 2025-07-31 RX ORDER — TADALAFIL 5 MG/1
5 TABLET ORAL DAILY
Qty: 90 TABLET | Refills: 0 | Status: SHIPPED | OUTPATIENT
Start: 2025-07-31